# Patient Record
Sex: FEMALE | Race: WHITE | NOT HISPANIC OR LATINO | Employment: UNEMPLOYED | URBAN - METROPOLITAN AREA
[De-identification: names, ages, dates, MRNs, and addresses within clinical notes are randomized per-mention and may not be internally consistent; named-entity substitution may affect disease eponyms.]

---

## 2017-07-12 PROBLEM — Z30.09 UNWANTED FERTILITY: Status: ACTIVE | Noted: 2017-07-12

## 2017-07-12 PROBLEM — Z30.2 ADMISSION FOR STERILIZATION: Status: ACTIVE | Noted: 2017-07-12

## 2023-01-30 ENCOUNTER — HOSPITAL ENCOUNTER (EMERGENCY)
Facility: HOSPITAL | Age: 38
Discharge: HOME | End: 2023-01-31
Attending: EMERGENCY MEDICINE
Payer: MEDICAID

## 2023-01-30 VITALS
OXYGEN SATURATION: 100 % | HEART RATE: 100 BPM | DIASTOLIC BLOOD PRESSURE: 84 MMHG | WEIGHT: 175 LBS | BODY MASS INDEX: 24.5 KG/M2 | TEMPERATURE: 98 F | SYSTOLIC BLOOD PRESSURE: 136 MMHG | HEIGHT: 71 IN | RESPIRATION RATE: 20 BRPM

## 2023-01-30 DIAGNOSIS — H57.89 IRRITATION OF LEFT EYE: Primary | ICD-10-CM

## 2023-01-30 RX ORDER — TETRACAINE HYDROCHLORIDE 5 MG/ML
2 SOLUTION OPHTHALMIC
Status: COMPLETED | OUTPATIENT
Start: 2023-01-30 | End: 2023-01-30

## 2023-01-30 RX ADMIN — TETRACAINE HYDROCHLORIDE 2 DROP: 5 SOLUTION OPHTHALMIC at 11:01

## 2023-01-31 PROCEDURE — 99284 EMERGENCY DEPT VISIT MOD MDM: CPT

## 2023-01-31 PROCEDURE — 25000003 PHARM REV CODE 250: Performed by: EMERGENCY MEDICINE

## 2023-01-31 RX ORDER — ERYTHROMYCIN 5 MG/G
OINTMENT OPHTHALMIC
Status: COMPLETED | OUTPATIENT
Start: 2023-01-31 | End: 2023-01-31

## 2023-01-31 RX ORDER — HYDROCODONE BITARTRATE AND ACETAMINOPHEN 10; 325 MG/1; MG/1
1 TABLET ORAL
Status: DISCONTINUED | OUTPATIENT
Start: 2023-01-31 | End: 2023-01-31 | Stop reason: HOSPADM

## 2023-01-31 RX ORDER — HYDROCODONE BITARTRATE AND ACETAMINOPHEN 10; 325 MG/1; MG/1
1 TABLET ORAL EVERY 6 HOURS PRN
Qty: 8 TABLET | Refills: 0 | Status: SHIPPED | OUTPATIENT
Start: 2023-01-31

## 2023-01-31 RX ORDER — ERYTHROMYCIN 5 MG/G
OINTMENT OPHTHALMIC
Qty: 3.5 G | Refills: 0 | Status: SHIPPED | OUTPATIENT
Start: 2023-01-31

## 2023-01-31 RX ADMIN — FLUORESCEIN SODIUM 1 EACH: 1 STRIP OPHTHALMIC at 12:01

## 2023-01-31 RX ADMIN — ERYTHROMYCIN 1 INCH: 5 OINTMENT OPHTHALMIC at 01:01

## 2023-01-31 NOTE — DISCHARGE INSTRUCTIONS
As we discussed, use erythromycin ointment to help prevent infection, and take Norco for pain not relieved by Tylenol and Motrin.  Do not drive or drink alcohol after taking Norco.  Follow-up with your eye doctor in the morning, and also with your primary care doctor.  Return here for any worsening signs or symptoms.

## 2023-01-31 NOTE — ED PROVIDER NOTES
Encounter Date: 2023       History     Chief Complaint   Patient presents with    Eye Problem     L eye irritation that started today. +redness +itching +discharge     37-year-old female, here from home via private vehicle for evaluation and treatment of left eye irritation.  She states that she was getting ready to go into a local store today, and just parked her car when she began feeling left eye pain and irritation.  She states it felt like something had gotten into her eye, but she denies any dust, dirt, etc. in the car.  Her eye began to water, and by the time she got home, I had begun to look irritated and red.  She does not wear contact lenses.  Nothing makes her symptoms any better or worse.  She is not tried any medications at home.    Review of patient's allergies indicates:  No Known Allergies  Past Medical History:   Diagnosis Date    Anxiety     Encounter for blood transfusion     Panic attacks     Seizures     last     Sleep-wake disorder     Thyroid disease          Past Surgical History:   Procedure Laterality Date     SECTION, LOW TRANSVERSE      THYROIDECTOMY, PARTIAL       Family History   Problem Relation Age of Onset    DAVIAN disease Mother     Early death Father     Cancer Maternal Grandmother         ovarian    Heart disease Maternal Grandmother     Pacemaker/defibrilator Maternal Grandmother     Heart disease Maternal Grandfather     Hypertension Maternal Grandfather     Diabetes Maternal Grandfather     Diabetes Paternal Grandfather     Hypertension Paternal Grandfather     Cancer Paternal Grandfather         skin     Social History     Tobacco Use    Smoking status: Every Day     Packs/day: 0.50     Types: Cigarettes    Smokeless tobacco: Never   Substance Use Topics    Alcohol use: Yes     Comment: occasional    Drug use: Yes     Types: Marijuana     Review of Systems   Constitutional: Negative.    HENT: Negative.     Eyes:  Positive for pain (Foreign body sensation),  discharge (Watery) and redness. Negative for photophobia and visual disturbance.        Symptoms involve the left eye only   Respiratory: Negative.     Cardiovascular: Negative.    Gastrointestinal: Negative.    Endocrine: Negative.    Genitourinary: Negative.    Musculoskeletal: Negative.    Skin: Negative.    Allergic/Immunologic: Negative.    Hematological: Negative.    Psychiatric/Behavioral: Negative.       Physical Exam     Initial Vitals   BP Pulse Resp Temp SpO2   01/30/23 2059 01/30/23 2056 01/30/23 2056 01/30/23 2056 01/30/23 2056   136/84 100 20 97.6 °F (36.4 °C) 100 %      MAP       --                Physical Exam    Nursing note and vitals reviewed.  Constitutional: She appears well-developed and well-nourished. No distress.   HENT:   Head: Normocephalic and atraumatic.   Mouth/Throat: Oropharynx is clear and moist.   Eyes: EOM are normal. Pupils are equal, round, and reactive to light. No scleral icterus.   Right eye normal in appearance.  Both pupils equal, round, and reactive.  Extraocular movements normal.  Left conjunctiva slightly injected.  Fluorescein staining reveals no uptake over the cornea or the sclera no Chuck sign.  Examination under magnification reveals no foreign bodies, no corneal abrasion, no corneal ulcer, no dendritic lesion.  No obvious stye.   Neck: No JVD present.   Normal range of motion.  Cardiovascular:  Normal rate, regular rhythm and normal heart sounds.     Exam reveals no friction rub.       No murmur heard.  Pulmonary/Chest: Breath sounds normal. No stridor. No respiratory distress. She has no wheezes. She has no rhonchi. She has no rales.   Abdominal: Abdomen is soft. Bowel sounds are normal. She exhibits no distension. There is no abdominal tenderness.   Musculoskeletal:         General: No tenderness or edema. Normal range of motion.      Cervical back: Normal range of motion.     Neurological: She is alert and oriented to person, place, and time. She has normal  strength. No cranial nerve deficit or sensory deficit. GCS score is 15. GCS eye subscore is 4. GCS verbal subscore is 5. GCS motor subscore is 6.   Skin: Skin is warm and dry. Capillary refill takes less than 2 seconds. No rash noted. No erythema.   Psychiatric: She has a normal mood and affect. Her behavior is normal.       ED Course   Procedures  Labs Reviewed - No data to display       Imaging Results    None          Medications   TETRAcaine HCL 0.5% ophthalmic solution 2 drop (has no administration in time range)   HYDROcodone-acetaminophen  mg per tablet 1 tablet (has no administration in time range)   fluorescein ophthalmic strip 1 each (1 each Left Eye Given 1/31/23 0001)   erythromycin 5 mg/gram (0.5 %) ophthalmic ointment (1 inch Left Eye Given 1/31/23 0100)     Medical Decision Making:   Differential Diagnosis:   Corneal abrasion, corneal ulceration, herpetic infection, bacterial infection, foreign body, stye, etc.  ED Management:  Physical exam is unremarkable except for signs of inflammation of the sclera of the left eye.  Likely irritation caused by foreign body which is no longer present.  Patient will be treated with erythromycin ointment here, and will be discharged with a prescription.  Also treated with hydrocodone here and a prescription for home.  She will follow-up with her ophthalmologist in the morning.  Return here as needed or if worse in any way.                        Clinical Impression:   Final diagnoses:  [H57.89] Irritation of left eye (Primary)        ED Disposition Condition    Discharge Stable          ED Prescriptions       Medication Sig Dispense Start Date End Date Auth. Provider    erythromycin (ROMYCIN) ophthalmic ointment Place a 1/8 inch ribbon of ointment into the lower eyelid three times daily for 7 days 3.5 g 1/31/2023 -- Lul Villatoro MD    HYDROcodone-acetaminophen (NORCO)  mg per tablet Take 1 tablet by mouth every 6 (six) hours as needed for Pain. 8  tablet 1/31/2023 -- Lul Villatoro MD          Follow-up Information       Follow up With Specialties Details Why Contact Info    Elena BRITTON NP Obstetrics and Gynecology Call today  [address] [facility], [place] [id number] [phone]      Your eye doctor  Call today      [facility] Emergency Medicine  As needed, If symptoms worsen [address] [facility] [id number] [phone]             Lul Villatoro MD  01/31/23 0102